# Patient Record
Sex: FEMALE | Race: WHITE | NOT HISPANIC OR LATINO | Employment: OTHER | URBAN - METROPOLITAN AREA
[De-identification: names, ages, dates, MRNs, and addresses within clinical notes are randomized per-mention and may not be internally consistent; named-entity substitution may affect disease eponyms.]

---

## 2023-06-13 ENCOUNTER — APPOINTMENT (OUTPATIENT)
Dept: CT IMAGING | Facility: CLINIC | Age: 73
End: 2023-06-13
Attending: EMERGENCY MEDICINE
Payer: COMMERCIAL

## 2023-06-13 ENCOUNTER — HOSPITAL ENCOUNTER (EMERGENCY)
Facility: CLINIC | Age: 73
Discharge: HOME | End: 2023-06-13
Attending: EMERGENCY MEDICINE | Admitting: EMERGENCY MEDICINE
Payer: COMMERCIAL

## 2023-06-13 ENCOUNTER — TELEPHONE (OUTPATIENT)
Dept: OTOLARYNGOLOGY | Facility: CLINIC | Age: 73
End: 2023-06-13

## 2023-06-13 VITALS
HEART RATE: 57 BPM | RESPIRATION RATE: 16 BRPM | WEIGHT: 120 LBS | TEMPERATURE: 97.6 F | SYSTOLIC BLOOD PRESSURE: 154 MMHG | DIASTOLIC BLOOD PRESSURE: 86 MMHG | OXYGEN SATURATION: 96 %

## 2023-06-13 DIAGNOSIS — S02.2XXA CLOSED FRACTURE OF NASAL SEPTUM, INITIAL ENCOUNTER: ICD-10-CM

## 2023-06-13 DIAGNOSIS — S09.90XA CLOSED HEAD INJURY, INITIAL ENCOUNTER: ICD-10-CM

## 2023-06-13 DIAGNOSIS — S01.511A LIP LACERATION, INITIAL ENCOUNTER: ICD-10-CM

## 2023-06-13 PROCEDURE — 250N000013 HC RX MED GY IP 250 OP 250 PS 637: Performed by: EMERGENCY MEDICINE

## 2023-06-13 PROCEDURE — 99284 EMERGENCY DEPT VISIT MOD MDM: CPT | Mod: 25

## 2023-06-13 PROCEDURE — 70486 CT MAXILLOFACIAL W/O DYE: CPT

## 2023-06-13 PROCEDURE — 70450 CT HEAD/BRAIN W/O DYE: CPT

## 2023-06-13 RX ORDER — ACETAMINOPHEN 500 MG
1000 TABLET ORAL ONCE
Status: COMPLETED | OUTPATIENT
Start: 2023-06-13 | End: 2023-06-13

## 2023-06-13 RX ORDER — OXYCODONE HYDROCHLORIDE 5 MG/1
5 TABLET ORAL EVERY 6 HOURS PRN
Qty: 12 TABLET | Refills: 0 | Status: SHIPPED | OUTPATIENT
Start: 2023-06-13 | End: 2023-06-16

## 2023-06-13 RX ORDER — ECHINACEA PURPUREA EXTRACT 125 MG
TABLET ORAL
Qty: 30 ML | Refills: 0 | Status: SHIPPED | OUTPATIENT
Start: 2023-06-13

## 2023-06-13 RX ADMIN — ACETAMINOPHEN 1000 MG: 500 TABLET ORAL at 04:30

## 2023-06-13 NOTE — TELEPHONE ENCOUNTER
[facility] Call Center    Phone Message    May a detailed message be left on voicemail: yes     Reason for Call: Other: Pt was seen in the ED middle of the night yesterday, has fractured nasal septum, per protocols sending TE and Pt prefers [place] location, please reach out to Pt's Daughter for scheduling options, thanks     Action Taken: Other: ENT    Travel Screening: Not Applicable

## 2023-06-13 NOTE — ED PROVIDER NOTES
History     Chief Complaint:  Fall       HPI   Tamy Olson is a 72 year old female who presents status post a fall.  Patient reports roughly an hour prior to arrival she rolled out of bed and states she hit her face on the floor.  She denies any loss of consciousness though does note immediately having a bloody nose.  She has not taken anything for analgesia.  She denies any headache, neck pain, back pain, chest pain, dyspnea or other symptoms.  No history of anticoagulation. Unknown tetanus status.       Independent Historian:   None - Patient Only    Review of External Notes: 6/9/23 office visit      Medications:    amoxicillin-clavulanate (AUGMENTIN) 875-125 MG tablet  oxyCODONE (ROXICODONE) 5 MG tablet  sodium chloride (OCEAN) 0.65 % nasal spray        Past Medical History:    No past medical history on file.    Past Surgical History:    No past surgical history on file.     Physical Exam   Patient Vitals for the past 24 hrs:   BP Temp Temp src Pulse Resp SpO2 Weight   06/13/23 0430 -- -- -- -- -- 96 % --   06/13/23 0429 (!) 154/86 -- -- 57 16 -- --   06/13/23 0126 (!) 199/100 97.6  F (36.4  C) Temporal 63 18 97 % 54.4 kg (120 lb)        Physical Exam  General: Alert. Appears comfortable  Head:  The scalp is without trauma  Eyes:  Sclera white; Pupils are equal and round; mild L. Periorbital ecchymosis, EOMI  ENT:    Nose with soft tissue swelling/ecchymosis to nasal bridge, tenderness to palpation. Slight skin tear to L. Nasal bridge    Dried epistaxis, no septal hematoma     No hemotympanum.  No zygomatic arch tenderness    0.5cm partial thickness laceration to upper vermillion border  Neck:  No midline tenderness or pain with full ROM.  CV:  Rate as above with regular rhythm   2/2 radial and dorsal pedal pulses  Resp:  Breath sounds clear and equal bilaterally    Non-labored, no retractions or accessory muscle use  GI:  Abdomen soft, non-tender, non-distended    No rebound tenderness or  guarding  MSK:  No midline tenderness or bony step-off    No deformity    Moves all extremities equally and symmetrically  Skin:  No rash or lesions noted.  Neuro:   No apparent deficit.    Strength 5/5 x4.  Sensation intact x4.     GCS: 15  Psych:  Normal affect.      Emergency Department Course       Imaging:  CT Facial Bones without Contrast   Final Result   IMPRESSION:   HEAD CT:   1.  No acute intracranial abnormality or significant change compared to the prior study.      FACIAL BONE CT:   1.  Acute left nasal bone and nasal septal fractures.         Head CT w/o contrast   Final Result   IMPRESSION:   HEAD CT:   1.  No acute intracranial abnormality or significant change compared to the prior study.      FACIAL BONE CT:   1.  Acute left nasal bone and nasal septal fractures.            Report per radiology        Emergency Department Course & Assessments:  Procedures     Laceration Repair      LACERATION:  A simple minimally Contaminated 0.5 cm laceration.    LOCATION:  Upper lip, vermillion border.    FUNCTION:  Distally sensation, circulation and motor are intact.    ANESTHESIA:  Local using lidocaine 1% with epi total of 1 mLs.    PREPARATION:  Irrigation with Normal Saline.    DEBRIDEMENT:  no debridement.    CLOSURE:  Wound was closed with 2 6.0 nylon sutures using simple interrupted technique        Interventions:  Medications   acetaminophen (TYLENOL) tablet 1,000 mg (1,000 mg Oral $Given 6/13/23 5019)        Consultations/Discussion of Management or Tests:  none       Social Determinants of Health affecting care:   None    Disposition:  The patient was discharged to home.     Impression & Plan      Medical Decision Making:  Patient is a 72-year-old female presenting status post a reported mechanical fall out of bed.  She has obvious facial trauma on exam.  She did undergo formal head CT as well as CT face prior to my evaluation from triage.  CT head unremarkable, facial CT does confirm nasal fracture  as well as nasal septum fracture.  I did recommend formal CT cervical spine given reported mechanism the patient is declining today.  She denies any neck pain and is otherwise neurologically intact.  She expressed understanding of missed or delayed diagnoses.  She was noted to have a laceration to her vermilion border which was repaired as noted herein.  We did discuss that this is an overall challenging repair and reviewed risk of scarring and infection.  Regarding her nasal fractures, plan for close ENT follow-up.  I will initiate pain control with oxycodone for breakthrough pain in addition to nasal saline sprays and Augmentin prophylactically given concern for nasal septal fracture.  Encouraged ice to face to assist to mitigate swelling.  Close PCP follow-up for suture removal as noted in discharge and ENT follow-up.  Finally we reviewed closed head injury precautions as well as red flag symptoms which would necessitate return to the ED.  Remainder of her head to toe exam is unremarkable. All questions addressed.    Diagnosis:    ICD-10-CM    1. Lip laceration, initial encounter  S01.511A       2. Closed head injury, initial encounter  S09.90XA       3. Closed fracture of nasal septum, initial encounter  S02.2XXA            Discharge Medications:  New Prescriptions    AMOXICILLIN-CLAVULANATE (AUGMENTIN) 875-125 MG TABLET    Take 1 tablet by mouth 2 times daily for 5 days    OXYCODONE (ROXICODONE) 5 MG TABLET    Take 1 tablet (5 mg) by mouth every 6 hours as needed for pain    SODIUM CHLORIDE (OCEAN) 0.65 % NASAL SPRAY    2 sprays to bilateral nares BID        6/13/2023   Judy Cummins, Judy Desouza,   06/13/23 0451

## 2023-06-13 NOTE — ED TRIAGE NOTES
Pt fell out of bed and landed on face. Lac noted to bridge of nose and upper lip. No other complaints. Not on thinners. Events occurred 30 mins PTA

## 2023-06-16 NOTE — TELEPHONE ENCOUNTER
Patient daughter called back concerned because she hasnt heard from anyone. Patient was told needed to be seen within 3 days in the ER if this is not the case the daughter is just wanting to know what the process is. Please reach out to ease questions. Thank you

## 2023-06-19 NOTE — TELEPHONE ENCOUNTER
FUTURE VISIT INFORMATION      FUTURE VISIT INFORMATION:    Date: 6/28/23    Time: 2:15PM    [place] [facility] [place]:    Referring provider:      Referring providers clinic:      Reason for visit/diagnosis  Broken nose ED visit 6/13    RECORDS REQUESTED FROM:       Clinic name Comments Records Status Imaging Status   [facility] ED  6/13/23 ED note Judy Cummins, DO    Images  CT Head and Facial Bone: 6/13/23 Epic PACS   [facility]   Fx. [phone]   CT HEAD BRAIN: 7/6/22 [facility]  req 6/19/23    PACS   [facility]   Tel [phone]   Fax [phone] MR Brain: 2/16/22 [facility]  req 6/19/23    PACS                       6/19/23 4:22PM sent a fax to [facility] - Amay   June 22, 2023 2:36 PM - Received image in pacs and attached it to patient- Brooklynn

## 2023-06-19 NOTE — TELEPHONE ENCOUNTER
The pt is calling to get the necessary appt. She is in a lot of pain and wants to be seen asap. Please call the pt back to discuss. Thanks.

## 2023-06-19 NOTE — TELEPHONE ENCOUNTER
This writer called the patient's daughter to offer an appointment with Dr. Burleson next Wednesday, June 28th, at 2:15 p.m.  The patient's daughter expressed frustration at having to wait several days to receive a call when the patient's discharge paperwork stated that the patient needed to be seen within 3-5 days. This writer actively listened to the patients daughter and attempted to reassure her that the patient should be fine as long as they are seen within two weeks from discharge from the ED.  The patient's daughter became more upset that the patient would not be seen sooner, and expressed that we did not call the patient back soon enough to properly care for them.  By the end of the phone call, the patient's daughter accepted the appointment and had no further questions.  Maria Teresa Rodriguez LPN

## 2023-06-28 ENCOUNTER — OFFICE VISIT (OUTPATIENT)
Dept: OTOLARYNGOLOGY | Facility: CLINIC | Age: 73
End: 2023-06-28
Payer: COMMERCIAL

## 2023-06-28 ENCOUNTER — PRE VISIT (OUTPATIENT)
Dept: OTOLARYNGOLOGY | Facility: CLINIC | Age: 73
End: 2023-06-28

## 2023-06-28 VITALS
HEART RATE: 73 BPM | WEIGHT: 126 LBS | DIASTOLIC BLOOD PRESSURE: 76 MMHG | OXYGEN SATURATION: 97 % | SYSTOLIC BLOOD PRESSURE: 127 MMHG

## 2023-06-28 DIAGNOSIS — S02.2XXA CLOSED FRACTURE OF NASAL BONE, INITIAL ENCOUNTER: Primary | ICD-10-CM

## 2023-06-28 PROCEDURE — 99203 OFFICE O/P NEW LOW 30 MIN: CPT | Performed by: OTOLARYNGOLOGY

## 2023-06-28 RX ORDER — MIRTAZAPINE 7.5 MG/1
TABLET, FILM COATED ORAL
COMMUNITY
Start: 2023-04-15

## 2023-06-28 RX ORDER — ARIPIPRAZOLE 5 MG/1
5 TABLET ORAL
COMMUNITY
Start: 2022-03-03

## 2023-06-28 RX ORDER — DONEPEZIL HYDROCHLORIDE 10 MG/1
TABLET, FILM COATED ORAL DAILY
COMMUNITY
Start: 2023-01-19

## 2023-06-28 RX ORDER — ATORVASTATIN CALCIUM 10 MG/1
TABLET, FILM COATED ORAL
COMMUNITY
Start: 2023-06-02

## 2023-06-28 RX ORDER — LEVOTHYROXINE SODIUM 88 UG/1
TABLET ORAL
COMMUNITY
Start: 2023-06-02

## 2023-06-28 RX ORDER — LORAZEPAM 1 MG/1
TABLET ORAL
COMMUNITY
Start: 2023-05-25

## 2023-06-28 RX ORDER — ONDANSETRON 4 MG/1
1 TABLET, FILM COATED ORAL EVERY 6 HOURS PRN
COMMUNITY
Start: 2023-01-03

## 2023-06-28 RX ORDER — FLUOXETINE 40 MG/1
CAPSULE ORAL
COMMUNITY
Start: 2022-06-30

## 2023-06-28 RX ORDER — CARBIDOPA AND LEVODOPA 25; 100 MG/1; MG/1
TABLET, EXTENDED RELEASE ORAL
COMMUNITY
Start: 2023-06-20

## 2023-06-28 ASSESSMENT — PAIN SCALES - GENERAL: PAINLEVEL: MILD PAIN (2)

## 2023-06-28 NOTE — PATIENT INSTRUCTIONS
1. You were seen in the ENT Clinic today by Dr. Burleson.  If you have any questions or concerns after your appointment, please call   - Option 1: ENT Clinic: [phone]   - Option 2: Kathy (Dr. Burleson's Nurse): [phone]                  Gloria MARTIN (Dr. Burleson's Nurse): [phone]    2.   Plan to return to clinic as needed    How to Contact Us:  Send a [employer] message to your provider. Our team will respond to you via [employer]. Occasionally, we will need to call you to get further information.  For urgent matters (Monday-Friday), call the ENT Clinic: [phone] and speak with a call center team member - they will route your call appropriately.   If you'd like to speak directly with a nurse, please find our contact information below. We do our best to check voicemail frequently throughout the day, and will work to call you back within 1-2 days. For urgent matters, please use the general clinic phone numbers listed above.       Kathy Hagen LPN  [facility] - Otolaryngology

## 2023-06-28 NOTE — NURSING NOTE
Chief Complaint   Patient presents with     Consult     Nasal fracture      Blood pressure 127/76, pulse 73, weight 57.2 kg (126 lb), SpO2 97 %.    Levi Mejia LPN

## 2023-06-28 NOTE — PROGRESS NOTES
HISTORY OF PRESENT ILLNESS:  Tamy is a 72-year-old female who has been diagnosed with Parkinson's and with her daughter admits that she sustained a fall out of bed approximately 2-3 weeks ago.  There was no loss of consciousness, but she did have a diagnosed nasal fracture and laceration to the upper lip.    Interestingly enough, she has not worn her denture in some time and when she tried to reinsert her denture, it was quite tender.  She feels she cannot wear it.      IMAGING:  Imaging shows there is no actual fracture to the mid face, but she does have a nasal fracture, closed in appearance.  She has a slight nasal deviation as well.    PAST MEDICAL HISTORY:  Reviewed.    MEDICATIONS:  Reviewed.      ALLERGIES:  Reviewed.    REVIEW OF SYSTEMS:  Significant for the above.  She has some rhinitis symptoms.    PHYSICAL EXAMINATION:  Examination shows no distortion of the nasal vault to palpation.  It is nontender and both she and her daughter feel the edema has completely subsided.  At the same time, the frenulum of the upper lip is somewhat extended as this was lacerated and repaired in the ED.    ASSESSMENT:  Closed nasal fracture.  In addition, she has repair of the frenum and buccal mucosa of the maxilla.    PLAN:  No intervention needed for the nasal fracture.  I have asked her to visit with her dentist who can relieve her denture at the frenum area to give her a little extra room, so as not to irritate it.  She will follow up with us as needed.

## 2023-06-28 NOTE — LETTER
6/28/2023       RE: Tamy Olson  [address]  [facility] [address]     Dear Colleague,    Thank you for referring your patient, Tamy Olson, to the [facility] [place] [facility]. Please see a copy of my visit note below.    HISTORY OF PRESENT ILLNESS:  Tamy is a 72-year-old female who has been diagnosed with Parkinson's and with her daughter admits that she sustained a fall out of bed approximately 2-3 weeks ago.  There was no loss of consciousness, but she did have a diagnosed nasal fracture and laceration to the upper lip.    Interestingly enough, she has not worn her denture in some time and when she tried to reinsert her denture, it was quite tender.  She feels she cannot wear it.      IMAGING:  Imaging shows there is no actual fracture to the mid face, but she does have a nasal fracture, closed in appearance.  She has a slight nasal deviation as well.    PAST MEDICAL HISTORY:  Reviewed.    MEDICATIONS:  Reviewed.      ALLERGIES:  Reviewed.    REVIEW OF SYSTEMS:  Significant for the above.  She has some rhinitis symptoms.    PHYSICAL EXAMINATION:  Examination shows no distortion of the nasal vault to palpation.  It is nontender and both she and her daughter feel the edema has completely subsided.  At the same time, the frenulum of the upper lip is somewhat extended as this was lacerated and repaired in the ED.    ASSESSMENT:  Closed nasal fracture.  In addition, she has repair of the frenum and buccal mucosa of the maxilla.    PLAN:  No intervention needed for the nasal fracture.  I have asked her to visit with her dentist who can relieve her denture at the frenum area to give her a little extra room, so as not to irritate it.  She will follow up with us as needed.        Again, thank you for allowing me to participate in the care of your patient.      Sincerely,    Get Burleson MD

## 2024-08-31 ENCOUNTER — APPOINTMENT (OUTPATIENT)
Dept: CT IMAGING | Facility: CLINIC | Age: 74
End: 2024-08-31
Attending: EMERGENCY MEDICINE
Payer: COMMERCIAL

## 2024-08-31 ENCOUNTER — HOSPITAL ENCOUNTER (EMERGENCY)
Facility: CLINIC | Age: 74
Discharge: HOME | End: 2024-08-31
Attending: EMERGENCY MEDICINE | Admitting: EMERGENCY MEDICINE
Payer: COMMERCIAL

## 2024-08-31 ENCOUNTER — HOSPITAL ENCOUNTER (EMERGENCY)
Facility: CLINIC | Age: 74
End: 2024-08-31
Admitting: EMERGENCY MEDICINE
Payer: COMMERCIAL

## 2024-08-31 ENCOUNTER — APPOINTMENT (OUTPATIENT)
Dept: MRI IMAGING | Facility: CLINIC | Age: 74
End: 2024-08-31
Attending: EMERGENCY MEDICINE
Payer: COMMERCIAL

## 2024-08-31 VITALS
DIASTOLIC BLOOD PRESSURE: 83 MMHG | WEIGHT: 154.32 LBS | HEART RATE: 49 BPM | RESPIRATION RATE: 16 BRPM | OXYGEN SATURATION: 98 % | HEIGHT: 66 IN | BODY MASS INDEX: 24.8 KG/M2 | SYSTOLIC BLOOD PRESSURE: 137 MMHG | TEMPERATURE: 99 F

## 2024-08-31 DIAGNOSIS — N39.0 UTI (URINARY TRACT INFECTION), UNCOMPLICATED: ICD-10-CM

## 2024-08-31 DIAGNOSIS — R29.90 STROKE-LIKE SYMPTOMS: ICD-10-CM

## 2024-08-31 LAB
ALBUMIN UR-MCNC: NEGATIVE MG/DL
ANION GAP SERPL CALCULATED.3IONS-SCNC: 8 MMOL/L (ref 7–15)
APPEARANCE UR: CLEAR
APTT PPP: 26 SECONDS (ref 22–38)
BASOPHILS # BLD AUTO: 0 10E3/UL (ref 0–0.2)
BASOPHILS NFR BLD AUTO: 1 %
BILIRUB UR QL STRIP: NEGATIVE
BUN SERPL-MCNC: 18.2 MG/DL (ref 8–23)
CALCIUM SERPL-MCNC: 7.9 MG/DL (ref 8.8–10.4)
CHLORIDE SERPL-SCNC: 101 MMOL/L (ref 98–107)
COLOR UR AUTO: ABNORMAL
CREAT SERPL-MCNC: 0.72 MG/DL (ref 0.51–0.95)
EGFRCR SERPLBLD CKD-EPI 2021: 87 ML/MIN/1.73M2
EOSINOPHIL # BLD AUTO: 0.1 10E3/UL (ref 0–0.7)
EOSINOPHIL NFR BLD AUTO: 3 %
ERYTHROCYTE [DISTWIDTH] IN BLOOD BY AUTOMATED COUNT: 12.2 % (ref 10–15)
GLUCOSE SERPL-MCNC: 92 MG/DL (ref 70–99)
GLUCOSE UR STRIP-MCNC: NEGATIVE MG/DL
HCO3 SERPL-SCNC: 24 MMOL/L (ref 22–29)
HCT VFR BLD AUTO: 33.6 % (ref 35–47)
HGB BLD-MCNC: 10.9 G/DL (ref 11.7–15.7)
HGB UR QL STRIP: NEGATIVE
IMM GRANULOCYTES # BLD: 0 10E3/UL
IMM GRANULOCYTES NFR BLD: 0 %
INR PPP: 1.24 (ref 0.85–1.15)
KETONES UR STRIP-MCNC: NEGATIVE MG/DL
LEUKOCYTE ESTERASE UR QL STRIP: ABNORMAL
LYMPHOCYTES # BLD AUTO: 1.3 10E3/UL (ref 0.8–5.3)
LYMPHOCYTES NFR BLD AUTO: 25 %
MCH RBC QN AUTO: 32.3 PG (ref 26.5–33)
MCHC RBC AUTO-ENTMCNC: 32.4 G/DL (ref 31.5–36.5)
MCV RBC AUTO: 100 FL (ref 78–100)
MONOCYTES # BLD AUTO: 0.5 10E3/UL (ref 0–1.3)
MONOCYTES NFR BLD AUTO: 9 %
NEUTROPHILS # BLD AUTO: 3.1 10E3/UL (ref 1.6–8.3)
NEUTROPHILS NFR BLD AUTO: 62 %
NITRATE UR QL: NEGATIVE
NRBC # BLD AUTO: 0 10E3/UL
NRBC BLD AUTO-RTO: 0 /100
PH UR STRIP: 6 [PH] (ref 5–7)
PLATELET # BLD AUTO: 175 10E3/UL (ref 150–450)
POTASSIUM SERPL-SCNC: 4.7 MMOL/L (ref 3.4–5.3)
RBC # BLD AUTO: 3.37 10E6/UL (ref 3.8–5.2)
RBC URINE: <1 /HPF
SODIUM SERPL-SCNC: 133 MMOL/L (ref 135–145)
SP GR UR STRIP: 1.02 (ref 1–1.03)
TROPONIN T SERPL HS-MCNC: 12 NG/L
UROBILINOGEN UR STRIP-MCNC: NORMAL MG/DL
WBC # BLD AUTO: 5 10E3/UL (ref 4–11)
WBC URINE: 2 /HPF

## 2024-08-31 PROCEDURE — 93005 ELECTROCARDIOGRAM TRACING: CPT

## 2024-08-31 PROCEDURE — 81001 URINALYSIS AUTO W/SCOPE: CPT | Performed by: EMERGENCY MEDICINE

## 2024-08-31 PROCEDURE — 80048 BASIC METABOLIC PNL TOTAL CA: CPT | Performed by: EMERGENCY MEDICINE

## 2024-08-31 PROCEDURE — 85610 PROTHROMBIN TIME: CPT | Performed by: EMERGENCY MEDICINE

## 2024-08-31 PROCEDURE — 99207 PR NO BILLABLE SERVICE THIS VISIT: CPT | Performed by: STUDENT IN AN ORGANIZED HEALTH CARE EDUCATION/TRAINING PROGRAM

## 2024-08-31 PROCEDURE — 70450 CT HEAD/BRAIN W/O DYE: CPT | Mod: XU

## 2024-08-31 PROCEDURE — 85730 THROMBOPLASTIN TIME PARTIAL: CPT | Performed by: EMERGENCY MEDICINE

## 2024-08-31 PROCEDURE — 250N000009 HC RX 250: Performed by: EMERGENCY MEDICINE

## 2024-08-31 PROCEDURE — 255N000002 HC RX 255 OP 636: Performed by: EMERGENCY MEDICINE

## 2024-08-31 PROCEDURE — 36415 COLL VENOUS BLD VENIPUNCTURE: CPT | Performed by: EMERGENCY MEDICINE

## 2024-08-31 PROCEDURE — 250N000011 HC RX IP 250 OP 636: Performed by: EMERGENCY MEDICINE

## 2024-08-31 PROCEDURE — A9585 GADOBUTROL INJECTION: HCPCS | Performed by: EMERGENCY MEDICINE

## 2024-08-31 PROCEDURE — 70496 CT ANGIOGRAPHY HEAD: CPT

## 2024-08-31 PROCEDURE — 87086 URINE CULTURE/COLONY COUNT: CPT | Performed by: EMERGENCY MEDICINE

## 2024-08-31 PROCEDURE — 99291 CRITICAL CARE FIRST HOUR: CPT | Performed by: PHYSICIAN ASSISTANT

## 2024-08-31 PROCEDURE — 70553 MRI BRAIN STEM W/O & W/DYE: CPT

## 2024-08-31 PROCEDURE — 85025 COMPLETE CBC W/AUTO DIFF WBC: CPT | Performed by: EMERGENCY MEDICINE

## 2024-08-31 PROCEDURE — 84484 ASSAY OF TROPONIN QUANT: CPT | Performed by: EMERGENCY MEDICINE

## 2024-08-31 PROCEDURE — 99285 EMERGENCY DEPT VISIT HI MDM: CPT | Mod: 25

## 2024-08-31 PROCEDURE — 250N000013 HC RX MED GY IP 250 OP 250 PS 637: Performed by: EMERGENCY MEDICINE

## 2024-08-31 RX ORDER — GADOBUTROL 604.72 MG/ML
7 INJECTION INTRAVENOUS ONCE
Status: COMPLETED | OUTPATIENT
Start: 2024-08-31 | End: 2024-08-31

## 2024-08-31 RX ORDER — IOPAMIDOL 755 MG/ML
67 INJECTION, SOLUTION INTRAVASCULAR ONCE
Status: DISCONTINUED | OUTPATIENT
Start: 2024-08-31 | End: 2024-08-31 | Stop reason: HOSPADM

## 2024-08-31 RX ORDER — CEPHALEXIN 500 MG/1
500 CAPSULE ORAL 2 TIMES DAILY
Qty: 10 CAPSULE | Refills: 0 | Status: SHIPPED | OUTPATIENT
Start: 2024-09-01 | End: 2024-09-06

## 2024-08-31 RX ORDER — CEPHALEXIN 500 MG/1
500 CAPSULE ORAL ONCE
Status: COMPLETED | OUTPATIENT
Start: 2024-08-31 | End: 2024-08-31

## 2024-08-31 RX ORDER — IOPAMIDOL 755 MG/ML
67 INJECTION, SOLUTION INTRAVASCULAR ONCE
Status: COMPLETED | OUTPATIENT
Start: 2024-08-31 | End: 2024-08-31

## 2024-08-31 RX ORDER — LORAZEPAM 2 MG/ML
1 INJECTION INTRAMUSCULAR ONCE
Status: COMPLETED | OUTPATIENT
Start: 2024-08-31 | End: 2024-08-31

## 2024-08-31 RX ADMIN — IOPAMIDOL 67 ML: 755 INJECTION, SOLUTION INTRAVENOUS at 14:00

## 2024-08-31 RX ADMIN — SODIUM CHLORIDE 100 ML: 9 INJECTION, SOLUTION INTRAVENOUS at 14:00

## 2024-08-31 RX ADMIN — CEPHALEXIN 500 MG: 500 CAPSULE ORAL at 17:09

## 2024-08-31 RX ADMIN — GADOBUTROL 7 ML: 604.72 INJECTION INTRAVENOUS at 16:03

## 2024-08-31 ASSESSMENT — ACTIVITIES OF DAILY LIVING (ADL)
ADLS_ACUITY_SCORE: 35

## 2024-08-31 ASSESSMENT — COLUMBIA-SUICIDE SEVERITY RATING SCALE - C-SSRS
2. HAVE YOU ACTUALLY HAD ANY THOUGHTS OF KILLING YOURSELF IN THE PAST MONTH?: NO
6. HAVE YOU EVER DONE ANYTHING, STARTED TO DO ANYTHING, OR PREPARED TO DO ANYTHING TO END YOUR LIFE?: NO
1. IN THE PAST MONTH, HAVE YOU WISHED YOU WERE DEAD OR WISHED YOU COULD GO TO SLEEP AND NOT WAKE UP?: NO

## 2024-08-31 NOTE — ED PROVIDER NOTES
Patient was signed out to me by Dr. Caldwell awaiting results of her MRI.  She did have acute onset of weakness, confusion and slurred speech but she also has Parkinson's.  The MRI came back normal.  There is no signs of acute stroke.  I discussed the case again with the stroke neurology team who felt she is safe to go home.  I read that this information the patient her daughters they are happy to hear this and she was discharged.     Emmett Gomez MD  08/31/24 8431

## 2024-08-31 NOTE — ED PROVIDER NOTES
"  Emergency Department Note      History of Present Illness     Chief Complaint   Stroke Symptoms      HPI   Tamy Olson is a 73 year old female presenting via EMS with her daughter after acute onset weakness, confusion, slurred speech.  She has a history of Parkinson's disease, but the daughter states that this was an abrupt change in her neurological status.  No fever, chills, chest pain, shortness of breath, nausea, vomiting, changes in vision, numbness/tingling in the extremities.    Independent Historian   Daughter as detailed above.    Review of External Notes   5/17/24: Clinic note reviewed    Past Medical History     Medical History and Problem List   No past medical history on file.    Medications   [START ON 9/1/2024] cephALEXin (KEFLEX) 500 MG capsule  ARIPiprazole (ABILIFY) 5 MG tablet  ascorbic acid 1000 MG TABS tablet  atorvastatin (LIPITOR) 10 MG tablet  carbidopa-levodopa (SINEMET CR)  MG CR tablet  donepezil (ARICEPT) 10 MG tablet  FLUoxetine (PROZAC) 40 MG capsule  levothyroxine (SYNTHROID/LEVOTHROID) 88 MCG tablet  LORazepam (ATIVAN) 1 MG tablet  mirtazapine (REMERON) 7.5 MG tablet  omeprazole (PRILOSEC) 20 MG DR capsule  ondansetron (ZOFRAN) 4 MG tablet  sodium chloride (OCEAN) 0.65 % nasal spray        Surgical History   No past surgical history on file.    Physical Exam     Patient Vitals for the past 24 hrs:   BP Temp Pulse Resp SpO2 Height Weight   08/31/24 1429 121/69 99  F (37.2  C) 60 16 99 % 1.676 m (5' 6\") 70 kg (154 lb 5.2 oz)     Physical Exam  General: No acute distress  Head: No obvious trauma to head.  Ears, Nose, Throat:  External ears normal.  Nose normal.  No pharyngeal erythema, swelling or exudate.  Midline uvula. Moist mucus membranes.  Eyes:  Conjunctivae clear.   Neck: Normal range of motion.  Neck supple.   CV: Regular rate and rhythm.  No murmurs.      Respiratory: Effort normal and breath sounds normal.  No wheezing or crackles.   Gastrointestinal: Soft.  " No distension. There is no tenderness.  There is no rigidity, no rebound and no guarding.   Musculoskeletal: Normal range of motion.  Non tender extremities to palpations. No lower extremity edema  Neuro: Alert. Moving all extremities appropriately.  Slurred speech. CN II-XII grossly intact, no pronator drift, normal finger-nose-finger, visual fields intact.  Gross muscle strength intact of the proximal and distal bilateral upper and lower extremities.  Sensation intact to light touch in all 4 extremities.   Skin: Skin is warm and dry.  No rash noted.   Psych: Normal mood and affect. Behavior is normal.       Diagnostics     Lab Results   Labs Ordered and Resulted from Time of ED Arrival to Time of ED Departure   BASIC METABOLIC PANEL - Abnormal       Result Value    Sodium 133 (*)     Potassium 4.7      Chloride 101      Carbon Dioxide (CO2) 24      Anion Gap 8      Urea Nitrogen 18.2      Creatinine 0.72      GFR Estimate 87      Calcium 7.9 (*)     Glucose 92     INR - Abnormal    INR 1.24 (*)    CBC WITH PLATELETS AND DIFFERENTIAL - Abnormal    WBC Count 5.0      RBC Count 3.37 (*)     Hemoglobin 10.9 (*)     Hematocrit 33.6 (*)           MCH 32.3      MCHC 32.4      RDW 12.2      Platelet Count 175      % Neutrophils 62      % Lymphocytes 25      % Monocytes 9      % Eosinophils 3      % Basophils 1      % Immature Granulocytes 0      NRBCs per 100 WBC 0      Absolute Neutrophils 3.1      Absolute Lymphocytes 1.3      Absolute Monocytes 0.5      Absolute Eosinophils 0.1      Absolute Basophils 0.0      Absolute Immature Granulocytes 0.0      Absolute NRBCs 0.0     ROUTINE UA WITH MICROSCOPIC REFLEX TO CULTURE - Abnormal    Color Urine Light Yellow      Appearance Urine Clear      Glucose Urine Negative      Bilirubin Urine Negative      Ketones Urine Negative      Specific Gravity Urine 1.018      Blood Urine Negative      pH Urine 6.0      Protein Albumin Urine Negative      Urobilinogen Urine Normal       Nitrite Urine Negative      Leukocyte Esterase Urine Moderate (*)     RBC Urine <1      WBC Urine 2     PARTIAL THROMBOPLASTIN TIME - Normal    aPTT 26     TROPONIN T, HIGH SENSITIVITY - Normal    Troponin T, High Sensitivity 12     GLUCOSE MONITOR NURSING POCT   GLUCOSE MONITOR NURSING POCT   URINE CULTURE       Imaging   MR Brain w/o & w Contrast   Final Result   IMPRESSION:   1.  No acute or subacute ischemic change.   2.  No acute intracranial process or abnormal enhancement.   3.  Age-related changes as above.      CTA Head Neck with Contrast   Final Result   IMPRESSION:    HEAD CTA:    1.  No large vessel occlusion, high-grade stenosis, aneurysm, or high-flow vascular malformation.      NECK CTA:   1.  No hemodynamically significant stenosis or dissection in the neck vessels.      Preliminary findings were discussed over the phone with Dr. Caldwell on 8/31/2024 at 1418 CST.      CT Head w/o Contrast   Final Result   IMPRESSION:   1.  No acute intracranial hemorrhage, extra-axial fluid collection, or evidence of an acute transcortical infarct.   2.  Similar presumed chronic microvascular ischemic changes.      Preliminary findings were discussed over the phone with Dr. Caldwell on 8/31/2024 at 1418 CST.          EKG   ECG taken at 1415, ECG read at 1421  Sinus rhythm with no ST elevation or depression  No prior EKG  Rate 52 bpm. OK interval 180 ms. QRS duration 90 ms. QT/QTc 476/442 ms. P-R-T axes 80 25 74.    Independent Interpretation   CT Head: No intracranial hemorrhage or midline shift.    ED Course      Medications Administered   Medications   iopamidol (ISOVUE-370) solution 67 mL (has no administration in time range)     And   sodium chloride 0.9 % bag for CT scan flush use (100 mLs As instructed $Given 8/31/24 1400)   iopamidol (ISOVUE-370) solution 67 mL (67 mLs Intravenous $Given 8/31/24 1400)     And   sodium chloride 0.9 % bag for CT scan flush use (has no administration in time range)   LORazepam  (ATIVAN) injection 1 mg (1 mg Intravenous Not Given 8/31/24 1515)   gadobutrol (GADAVIST) injection 7 mL (7 mLs Intravenous $Given 8/31/24 1603)   cephALEXin (KEFLEX) capsule 500 mg (500 mg Oral $Given 8/31/24 1709)       Procedures   Procedures     Discussion of Management   Stroke neurology    ED Course   ED Course as of 08/31/24 1724   Sat Aug 31, 2024   1356 Initial evaluation and assessment of patient   1520 I reevaluated the patient       Additional Documentation  None    Medical Decision Making / Diagnosis     CMS Diagnoses: None    MIPS       None    [facility]   Tamy Olson is a 73 year old female presenting with concern for stroke.  She has a history of dementia but had acute onset confusion, weakness and slurred speech.  Code stroke is called.  CT shows no sign of acute stroke or large vessel occlusion.  I discussed the patient with radiology and stroke neurology.  Stroke neurology recommends obtaining an MRI.  This is obtained and is negative for stroke.  UA reveals a UTI.  This could be causing her acute onset symptoms.  She does report feeling better upon reevaluation.  She is given a dose of Keflex and is given a prescription for a course of Keflex for UTI.  Currently awaiting stroke neurology recommendations at time of signout to my oncoming colleague.  Plan will be to discharge the patient and the stroke neurology has any other recommendations.  The patient and her daughters are agreeable with this plan.      Disposition   Care of the patient was transferred to my colleague Dr. Gomez pending stroke neurology recommendations.     Diagnosis     ICD-10-CM    1. UTI (urinary tract infection), uncomplicated  N39.0       2. Stroke-like symptoms  R29.90            Discharge Medications   New Prescriptions    CEPHALEXIN (KEFLEX) 500 MG CAPSULE    Take 1 capsule (500 mg) by mouth 2 times daily for 5 days. Do not start before September 1, 2024.         MD Javi Holliday Stephen, MD  08/31/24  0512

## 2024-08-31 NOTE — ED NOTES
Bed: ST02  Expected date:   Expected time:   Means of arrival:   Comments:  [facility] code stroke 74 f eta 13

## 2024-08-31 NOTE — CONSULTS
"[facility]     Stroke Code Note          History of Present Illness     Chief Complaint: Stroke Symptoms      Tamy Olson is a 73 year old woman who presents to the ER with her two daughters after being at the grocery store and then becoming acutely confused, off balance, and having worse slurred speech.  At baseline she does have some issues with memory and balance because she has Lewy Body Dementia and Parkinson's per her daughers, with some worsening in the last few months. But generally this was a big acute decline today much sharper than recently. They did notice b/l leg weakness but nothing particularly focal today. They do not feel her speech continues to be very slurred compared to her baseline.    On evaluation in her room after CT she has no specific complaints.          Past Medical History     Stroke risk factors: HTN    Preadmission antithrombotic regimen: none                   Assessment and Plan       1.  Dysarthria and confusion concerning for acute stroke. Due to relatively minor symptoms at present that are nondisabling, not a candidate for treatment with tenecteplase     Intravenous Thrombolysis  Not given due to:   - minor/isolated/quickly resolving symptoms     Endovascular Treatment  Not initiated due to absence of proximal vessel occlusion     Plan:  - Check brain MRI with and without contrast and call us back after for further recommendations if stroke seen  - Await radiology read on aortic arch abnormality discussed below     ___________________________________________________________________    Marli Razo PA-C  Vascular Neurology    To page me or covering stroke neurology team member, click here: [facility]  Choose \"On Call\" tab at top, then select \"NEUROLOGY/ALL SITES\" from middle drop-down box, press Enter, then look for \"stroke\" or \"telestroke\" for your site.  ___________________________________________________________________        " Imaging/Labs   (personally reviewed )    CT head: no obvious stroke, ich, edema  CTA head/neck: No obvious carotid, vertebral or intracranial stenosis or blockage on my personal review. There is a hypodensity in the aortic arch (awaiting radiology read on what it is)--- thrombus? Soft plaque?           Physical Examination     BP: 121/69   Pulse: 60   Resp: 16   Temp: 99  F (37.2  C)       SpO2: 99 %   O2 Device: None (Room air)   Weight: 70 kg (154 lb 5.2 oz)    Wt Readings from Last 2 Encounters:   08/31/24 70 kg (154 lb 5.2 oz)   06/28/23 57.2 kg (126 lb)       General: Sitting up in bed in no acute distress, very pleasant. Some difficulty following commands requiring multiple explanations    Neurologic exam:    Mental status: Oriented to self, month, situation generally but not really aware of where she was earlier today when this happened.  Names and repeats well.    Cranial nerves: Pupils equal round and reactive to light, extraocular movements intact without nystagmus, visual fields full, facial and sensation intact, hearing normal, tongue protrusion midline    Motor: No pronator drift or leg drift. No other signs of hemiparesis    Sensation: Light touch sensation normal in all 4 extremities without any neglect    Coordination: noral FTN and HTN, no ataxia    Gait: deferred         Stroke Scales       NIHSS  1a. Level of Consciousness 0-->Alert, keenly responsive   1b. LOC Questions 0-->Answers both questions correctly   1c. LOC Commands 0-->Performs both tasks correctly   2.   Best Gaze 0-->Normal   3.   Visual 0-->No visual loss   4.   Facial Palsy 0-->Normal symmetrical movements   5a. Motor Arm, Left 0-->No drift, limb holds 90 (or 45) degrees for full 10 secs   5b. Motor Arm, Right 0-->No drift, limb holds 90 (or 45) degrees for full 10 secs   6a. Motor Leg, Left 0-->No drift, leg holds 30 degree position for full 5 secs   6b. Motor Leg, right 0-->No drift, leg holds 30 degree position for full 5 secs    7.   Limb Ataxia 0-->Absent   8.   Sensory 0-->Normal, no sensory loss   9.   Best Language 0-->No aphasia, normal   10. Dysarthria 1-->Mild-to-moderate dysarthria, patient slurs at least some words and, at worst, can be understood with some difficulty   11. Extinction and Inattention  0-->No abnormality   Total 1 (08/31/24 1431)          Labs     CBC  Lab Results   Component Value Date    HGB 10.9 (L) 08/31/2024    HCT 33.6 (L) 08/31/2024    WBC 5.0 08/31/2024     08/31/2024       BMP  Lab Results   Component Value Date     (L) 08/31/2024    POTASSIUM 4.7 08/31/2024    CHLORIDE 101 08/31/2024    CO2 24 08/31/2024    BUN 18.2 08/31/2024    CR 0.72 08/31/2024    GLC 92 08/31/2024    MILLICENT 7.9 (L) 08/31/2024       INR  INR   Date Value Ref Range Status   08/31/2024 1.24 (H) 0.85 - 1.15 Final       Data   Stroke Code Data  (for stroke code without tele)  Stroke code activated 08/31/24  1356   First stroke provider response 08/31/24  1357   Last known normal 08/31/24  1100   Time of discovery (or onset of symptoms) 08/31/24  1059   Head CT read by Stroke Neuro Provider 08/31/24  1406   Was stroke code de-escalated? Yes  08/31/24  1433        Clinically Significant Risk Factors Present on Admission               # Coagulation Defect: INR = 1.24 (Ref range: 0.85 - 1.15) and/or PTT = 26 Seconds (Ref range: 22 - 38 Seconds), will monitor for bleeding       # Anemia: based on hgb <11                 Time Spent on this Encounter   Billing: I personally examined and evaluated the patient today. At the time of my evaluation and management the patient was critical condition today due to stroke symptoms and ongoing neurologic deficits. I personally managed treatment decision. Key decisions made today included no tenecteplase, get mri. I spent a total of 50 minutes providing critical care services, evaluating the patient, directing care and reviewing laboratory values and radiologic reports.

## 2024-08-31 NOTE — DISCHARGE INSTRUCTIONS
Your CT scan and MRI show that you did not have a stroke.  Your urine sample shows that you have a urinary tract infection.  We are giving you a prescription for antibiotics to treat this.  It is possible that a urinary tract infection caused your symptoms.  We recommend that you follow-up with your primary care provider.  Please return the emergency department if you develop any new or concerning symptoms.

## 2024-08-31 NOTE — ED TRIAGE NOTES
Patient came into the ED with daughter who noticed weakness, confusion and unable to walk properly. Patient also has significant facial droop. Patient also has history of deficits.     Triage Assessment (Adult)       Row Name 08/31/24 7923          Triage Assessment    Airway WDL WDL        Respiratory WDL    Respiratory WDL WDL        Skin Circulation/Temperature WDL    Skin Circulation/Temperature WDL WDL        Cardiac WDL    Cardiac WDL WDL        Peripheral/Neurovascular WDL    Peripheral Neurovascular WDL WDL        Cognitive/Neuro/Behavioral WDL    Cognitive/Neuro/Behavioral WDL X

## 2024-09-01 LAB
ATRIAL RATE - MUSE: 52 BPM
BACTERIA UR CULT: NORMAL
DIASTOLIC BLOOD PRESSURE - MUSE: NORMAL MMHG
INTERPRETATION ECG - MUSE: NORMAL
P AXIS - MUSE: 80 DEGREES
PR INTERVAL - MUSE: 180 MS
QRS DURATION - MUSE: 90 MS
QT - MUSE: 476 MS
QTC - MUSE: 442 MS
R AXIS - MUSE: 25 DEGREES
SYSTOLIC BLOOD PRESSURE - MUSE: NORMAL MMHG
T AXIS - MUSE: 74 DEGREES
VENTRICULAR RATE- MUSE: 52 BPM